# Patient Record
Sex: FEMALE | Race: WHITE | NOT HISPANIC OR LATINO | URBAN - METROPOLITAN AREA
[De-identification: names, ages, dates, MRNs, and addresses within clinical notes are randomized per-mention and may not be internally consistent; named-entity substitution may affect disease eponyms.]

---

## 2022-04-18 ENCOUNTER — WEB ENCOUNTER (OUTPATIENT)
Dept: URBAN - METROPOLITAN AREA CLINIC 70 | Facility: CLINIC | Age: 77
End: 2022-04-18

## 2022-04-18 ENCOUNTER — LAB OUTSIDE AN ENCOUNTER (OUTPATIENT)
Dept: URBAN - METROPOLITAN AREA CLINIC 70 | Facility: CLINIC | Age: 77
End: 2022-04-18

## 2022-04-18 ENCOUNTER — OFFICE VISIT (OUTPATIENT)
Dept: URBAN - METROPOLITAN AREA CLINIC 70 | Facility: CLINIC | Age: 77
End: 2022-04-18
Payer: MEDICARE

## 2022-04-18 VITALS
DIASTOLIC BLOOD PRESSURE: 73 MMHG | TEMPERATURE: 97.7 F | HEART RATE: 79 BPM | WEIGHT: 179 LBS | SYSTOLIC BLOOD PRESSURE: 124 MMHG | BODY MASS INDEX: 30.56 KG/M2 | HEIGHT: 64 IN

## 2022-04-18 DIAGNOSIS — K31.7 BENIGN GASTRIC POLYP: ICD-10-CM

## 2022-04-18 DIAGNOSIS — K59.09 CHRONIC CONSTIPATION: ICD-10-CM

## 2022-04-18 DIAGNOSIS — D50.0 IRON DEFICIENCY ANEMIA DUE TO CHRONIC BLOOD LOSS: ICD-10-CM

## 2022-04-18 DIAGNOSIS — Z86.010 PERSONAL HISTORY OF COLONIC POLYPS: ICD-10-CM

## 2022-04-18 DIAGNOSIS — K44.9 HIATAL HERNIA: ICD-10-CM

## 2022-04-18 DIAGNOSIS — R19.5 HEME + STOOL: ICD-10-CM

## 2022-04-18 DIAGNOSIS — K92.1 MELENA: ICD-10-CM

## 2022-04-18 PROBLEM — 78809005: Status: ACTIVE | Noted: 2022-04-18

## 2022-04-18 PROCEDURE — 99204 OFFICE O/P NEW MOD 45 MIN: CPT | Performed by: NURSE PRACTITIONER

## 2022-04-18 RX ORDER — CALCITRIOL 0.5 UG/1
CAPSULE, LIQUID FILLED ORAL
Qty: 0 | Refills: 0 | Status: DISCONTINUED | COMMUNITY
Start: 1900-01-01

## 2022-04-18 RX ORDER — POLYETHYLENE GLYCOL 3350 17 G/17G
MIX ONE SCOOP IN AT LEAST 8 OZ OF WATER, TEA, COFFEE, JUICE POWDER, FOR SOLUTION ORAL
Qty: 765 GRAMS | Refills: 11 | OUTPATIENT

## 2022-04-18 RX ORDER — APIXABAN 5 MG/1
TAKE 1 TABLET (5 MG) BY ORAL ROUTE 2 TIMES PER DAY TABLET, FILM COATED ORAL 2
Qty: 0 | Refills: 0 | Status: ACTIVE | COMMUNITY
Start: 1900-01-01

## 2022-04-18 RX ORDER — ALENDRONATE SODIUM 70 MG/1
TABLET ORAL
Qty: 0 | Refills: 0 | Status: ACTIVE | COMMUNITY
Start: 1900-01-01

## 2022-04-18 RX ORDER — VITAMIN A 2400 MCG
1 TABLET CAPSULE ORAL ONCE A DAY
Status: ACTIVE | COMMUNITY

## 2022-04-18 NOTE — HPI-TODAY'S VISIT:
Patient presents today due to positive occult blood noted in stool x 2 and anemia.   Stool was positive for occult blood x 2 in March 2022.  Labs 02/25/2022:  Hgb 7.08, MCV 88.5, , BUN 41 Labs 01/25/2022:  Hgb 8.28, .3, , BUN 23 Has been followed by hematology for over a year.  Has received several parental iron infusions over the last year.  Last PRBCs transfusion was about a month ago.  Started on oral iron tablets daily about 2 weeks ago.   Patient reports melenotic stools for about a year.  Defecation occurs 1-2 times a week. Stools are firm with straining.  Has had to perform manual disimpaction.    Last EGD August 2019:  Normal esophagus, variable Z-line,  6 cm sized hiatal hernia, 15 mm sessile multiloblated polyp in distal gastric body, minimal gastritis, normal duodenum. Last colonoscopy June 2016:  Hyperplastic polyp  EUS 09/2019:  Large hiatal hernia, 12 mm pedunculated gastric polyp s/p removal with hot snare, hypoechoic round mass in greater curvature in stomach in body measuring 13 mm x 8 mm (benign), gallstones with normal CBD.  Of note, patient has left upper arm AV graft that was placed one year ago.  However, denies having to start HD due to improving renal function.

## 2022-05-06 PROBLEM — 428283002: Status: ACTIVE | Noted: 2022-04-18

## 2022-05-06 PROBLEM — 724556004: Status: ACTIVE | Noted: 2022-04-18

## 2022-05-13 ENCOUNTER — TELEPHONE ENCOUNTER (OUTPATIENT)
Dept: URBAN - METROPOLITAN AREA CLINIC 92 | Facility: CLINIC | Age: 77
End: 2022-05-13

## 2022-05-13 ENCOUNTER — OFFICE VISIT (OUTPATIENT)
Dept: URBAN - METROPOLITAN AREA SURGERY CENTER 24 | Facility: SURGERY CENTER | Age: 77
End: 2022-05-13
Payer: MEDICARE

## 2022-05-13 ENCOUNTER — CLAIMS CREATED FROM THE CLAIM WINDOW (OUTPATIENT)
Dept: URBAN - METROPOLITAN AREA CLINIC 4 | Facility: CLINIC | Age: 77
End: 2022-05-13
Payer: MEDICARE

## 2022-05-13 DIAGNOSIS — K21.9 ACID REFLUX: ICD-10-CM

## 2022-05-13 DIAGNOSIS — D50.9 ANEMIA: ICD-10-CM

## 2022-05-13 DIAGNOSIS — R19.5 ABNORMAL CONSISTENCY OF STOOL: ICD-10-CM

## 2022-05-13 DIAGNOSIS — D12.0 BENIGN NEOPLASM OF CECUM: ICD-10-CM

## 2022-05-13 DIAGNOSIS — K31.89 ACQUIRED DEFORMITY OF DUODENUM: ICD-10-CM

## 2022-05-13 DIAGNOSIS — D12.0 ADENOMA OF CECUM: ICD-10-CM

## 2022-05-13 DIAGNOSIS — K21.9 GASTRO-ESOPHAGEAL REFLUX DISEASE WITHOUT ESOPHAGITIS: ICD-10-CM

## 2022-05-13 DIAGNOSIS — K29.70 GASTRITIS, UNSPECIFIED, WITHOUT BLEEDING: ICD-10-CM

## 2022-05-13 PROCEDURE — G8907 PT DOC NO EVENTS ON DISCHARG: HCPCS | Performed by: INTERNAL MEDICINE

## 2022-05-13 PROCEDURE — 88305 TISSUE EXAM BY PATHOLOGIST: CPT | Performed by: PATHOLOGY

## 2022-05-13 PROCEDURE — 43239 EGD BIOPSY SINGLE/MULTIPLE: CPT | Performed by: INTERNAL MEDICINE

## 2022-05-13 PROCEDURE — 45380 COLONOSCOPY AND BIOPSY: CPT | Performed by: INTERNAL MEDICINE

## 2022-05-13 PROCEDURE — 88312 SPECIAL STAINS GROUP 1: CPT | Performed by: PATHOLOGY

## 2022-05-13 RX ORDER — APIXABAN 5 MG/1
TAKE 1 TABLET (5 MG) BY ORAL ROUTE 2 TIMES PER DAY TABLET, FILM COATED ORAL 2
Qty: 0 | Refills: 0 | Status: ACTIVE | COMMUNITY
Start: 1900-01-01

## 2022-05-13 RX ORDER — ALENDRONATE SODIUM 70 MG/1
TABLET ORAL
Qty: 0 | Refills: 0 | Status: ACTIVE | COMMUNITY
Start: 1900-01-01

## 2022-05-13 RX ORDER — VITAMIN A 2400 MCG
1 TABLET CAPSULE ORAL ONCE A DAY
Status: ACTIVE | COMMUNITY

## 2022-05-13 RX ORDER — PANTOPRAZOLE SODIUM 40 MG/1
1 TABLET TABLET, DELAYED RELEASE ORAL ONCE A DAY
Qty: 90 TABLET | Refills: 0 | OUTPATIENT
Start: 2022-05-13

## 2022-05-13 RX ORDER — POLYETHYLENE GLYCOL 3350 17 G/17G
MIX ONE SCOOP IN AT LEAST 8 OZ OF WATER, TEA, COFFEE, JUICE POWDER, FOR SOLUTION ORAL
Qty: 765 GRAMS | Refills: 11 | Status: ACTIVE | COMMUNITY

## 2023-09-19 ENCOUNTER — P2P PATIENT RECORD (OUTPATIENT)
Age: 78
End: 2023-09-19

## 2023-09-27 ENCOUNTER — WEB ENCOUNTER (OUTPATIENT)
Dept: URBAN - METROPOLITAN AREA CLINIC 70 | Facility: CLINIC | Age: 78
End: 2023-09-27

## 2023-09-29 ENCOUNTER — LAB OUTSIDE AN ENCOUNTER (OUTPATIENT)
Dept: URBAN - METROPOLITAN AREA CLINIC 70 | Facility: CLINIC | Age: 78
End: 2023-09-29

## 2023-09-29 ENCOUNTER — OFFICE VISIT (OUTPATIENT)
Dept: URBAN - METROPOLITAN AREA CLINIC 70 | Facility: CLINIC | Age: 78
End: 2023-09-29
Payer: MEDICARE

## 2023-09-29 ENCOUNTER — WEB ENCOUNTER (OUTPATIENT)
Dept: URBAN - METROPOLITAN AREA CLINIC 70 | Facility: CLINIC | Age: 78
End: 2023-09-29

## 2023-09-29 VITALS
HEART RATE: 85 BPM | HEIGHT: 64 IN | TEMPERATURE: 96.8 F | SYSTOLIC BLOOD PRESSURE: 134 MMHG | WEIGHT: 191.4 LBS | BODY MASS INDEX: 32.68 KG/M2 | DIASTOLIC BLOOD PRESSURE: 79 MMHG

## 2023-09-29 DIAGNOSIS — D50.9 ANEMIA, IRON DEFICIENCY: ICD-10-CM

## 2023-09-29 PROCEDURE — 99214 OFFICE O/P EST MOD 30 MIN: CPT | Performed by: REGISTERED NURSE

## 2023-09-29 RX ORDER — VITAMIN A 2400 MCG
1 TABLET CAPSULE ORAL ONCE A DAY
Status: DISCONTINUED | COMMUNITY

## 2023-09-29 RX ORDER — ALENDRONATE SODIUM 70 MG/1
TABLET ORAL
Qty: 0 | Refills: 0 | Status: DISCONTINUED | COMMUNITY
Start: 1900-01-01

## 2023-09-29 RX ORDER — PANTOPRAZOLE SODIUM 40 MG/1
1 TABLET TABLET, DELAYED RELEASE ORAL ONCE A DAY
Qty: 90 TABLET | Refills: 0 | Status: ACTIVE | COMMUNITY
Start: 2022-05-13

## 2023-09-29 RX ORDER — POLYETHYLENE GLYCOL 3350 17 G/17G
MIX ONE SCOOP IN AT LEAST 8 OZ OF WATER, TEA, COFFEE, JUICE POWDER, FOR SOLUTION ORAL
Qty: 765 GRAMS | Refills: 11 | Status: DISCONTINUED | COMMUNITY

## 2023-09-29 RX ORDER — APIXABAN 5 MG/1
TAKE 1 TABLET (5 MG) BY ORAL ROUTE 2 TIMES PER DAY TABLET, FILM COATED ORAL 2
Qty: 0 | Refills: 0 | Status: ACTIVE | COMMUNITY
Start: 1900-01-01

## 2023-09-29 NOTE — HPI-TODAY'S VISIT:
Pt referred for anemia. They deny any GI bleeding. Labs on 9/12/23 show Hgb 9.7, MCV 91, Iron 19.0, Iron Sat 9.3%, and Ferritin 232.2. She has been getting IV iron with minimal improvement. Prior GI workup includes an EGD 5/13/22 with finding of a large hiatal hernia and gastritis(no hpylori) and a colonoscopy 5/13/22 with finding of diverticulosis and a tubular adenoma in the cecum(5 yr recall).

## 2023-10-24 ENCOUNTER — OFFICE VISIT (OUTPATIENT)
Dept: URBAN - METROPOLITAN AREA CLINIC 69 | Facility: CLINIC | Age: 78
End: 2023-10-24
Payer: MEDICARE

## 2023-10-24 DIAGNOSIS — D50.0 1. ANEMIA, IRON DEFICIENCY FROM CHRONIC BLOOD LOSS:: ICD-10-CM

## 2023-10-24 PROCEDURE — 91110 GI TRC IMG INTRAL ESOPH-ILE: CPT | Performed by: INTERNAL MEDICINE

## 2023-10-24 RX ORDER — PANTOPRAZOLE SODIUM 40 MG/1
1 TABLET TABLET, DELAYED RELEASE ORAL ONCE A DAY
Qty: 90 TABLET | Refills: 0 | Status: ACTIVE | COMMUNITY
Start: 2022-05-13

## 2023-10-24 RX ORDER — APIXABAN 5 MG/1
TAKE 1 TABLET (5 MG) BY ORAL ROUTE 2 TIMES PER DAY TABLET, FILM COATED ORAL 2
Qty: 0 | Refills: 0 | Status: ACTIVE | COMMUNITY
Start: 1900-01-01

## 2023-11-09 ENCOUNTER — TELEPHONE ENCOUNTER (OUTPATIENT)
Dept: URBAN - METROPOLITAN AREA CLINIC 70 | Facility: CLINIC | Age: 78
End: 2023-11-09

## 2023-11-09 ENCOUNTER — LAB OUTSIDE AN ENCOUNTER (OUTPATIENT)
Dept: URBAN - METROPOLITAN AREA CLINIC 70 | Facility: CLINIC | Age: 78
End: 2023-11-09

## 2023-11-15 ENCOUNTER — OFFICE VISIT (OUTPATIENT)
Dept: URBAN - METROPOLITAN AREA SURGERY CENTER 24 | Facility: SURGERY CENTER | Age: 78
End: 2023-11-15

## 2023-11-27 ENCOUNTER — TELEPHONE ENCOUNTER (OUTPATIENT)
Dept: URBAN - METROPOLITAN AREA CLINIC 70 | Facility: CLINIC | Age: 78
End: 2023-11-27

## 2023-12-06 ENCOUNTER — CLAIMS CREATED FROM THE CLAIM WINDOW (OUTPATIENT)
Dept: URBAN - METROPOLITAN AREA SURGERY CENTER 24 | Facility: SURGERY CENTER | Age: 78
End: 2023-12-06
Payer: MEDICARE

## 2023-12-06 DIAGNOSIS — K55.21 ACQUIRED ARTERIOVENOUS MALFORMATION OF SMALL INTESTINE WITH HEMORRHAGE: ICD-10-CM

## 2023-12-06 DIAGNOSIS — K55.8 OTHER VASCULAR DISORDERS OF INTESTINE: ICD-10-CM

## 2023-12-06 DIAGNOSIS — K92.1 MELENA: ICD-10-CM

## 2023-12-06 DIAGNOSIS — K44.9 HIATAL HERNIA: ICD-10-CM

## 2023-12-06 PROCEDURE — 44366 SMALL BOWEL ENDOSCOPY: CPT | Performed by: INTERNAL MEDICINE

## 2023-12-06 PROCEDURE — 00731 ANES UPR GI NDSC PX NOS: CPT | Performed by: NURSE ANESTHETIST, CERTIFIED REGISTERED

## 2023-12-06 PROCEDURE — G8907 PT DOC NO EVENTS ON DISCHARG: HCPCS | Performed by: INTERNAL MEDICINE

## 2023-12-06 RX ORDER — APIXABAN 5 MG/1
TAKE 1 TABLET (5 MG) BY ORAL ROUTE 2 TIMES PER DAY TABLET, FILM COATED ORAL 2
Qty: 0 | Refills: 0 | Status: ACTIVE | COMMUNITY
Start: 1900-01-01

## 2023-12-06 RX ORDER — PANTOPRAZOLE SODIUM 40 MG/1
1 TABLET TABLET, DELAYED RELEASE ORAL ONCE A DAY
Qty: 90 TABLET | Refills: 0 | Status: ACTIVE | COMMUNITY
Start: 2022-05-13

## 2023-12-21 ENCOUNTER — DASHBOARD ENCOUNTERS (OUTPATIENT)
Age: 78
End: 2023-12-21

## 2023-12-21 ENCOUNTER — OFFICE VISIT (OUTPATIENT)
Dept: URBAN - METROPOLITAN AREA CLINIC 70 | Facility: CLINIC | Age: 78
End: 2023-12-21
Payer: MEDICARE

## 2023-12-21 VITALS
SYSTOLIC BLOOD PRESSURE: 146 MMHG | HEART RATE: 72 BPM | TEMPERATURE: 97.5 F | WEIGHT: 185.6 LBS | DIASTOLIC BLOOD PRESSURE: 77 MMHG | HEIGHT: 64 IN | BODY MASS INDEX: 31.69 KG/M2

## 2023-12-21 DIAGNOSIS — K55.21 AVM (ARTERIOVENOUS MALFORMATION) OF SMALL BOWEL, ACQUIRED WITH HEMORRHAGE: ICD-10-CM

## 2023-12-21 DIAGNOSIS — D50.9 ANEMIA, IRON DEFICIENCY: ICD-10-CM

## 2023-12-21 PROCEDURE — 99214 OFFICE O/P EST MOD 30 MIN: CPT | Performed by: REGISTERED NURSE

## 2023-12-21 RX ORDER — APIXABAN 5 MG/1
TAKE 1 TABLET (5 MG) BY ORAL ROUTE 2 TIMES PER DAY TABLET, FILM COATED ORAL 2
Qty: 0 | Refills: 0 | Status: ACTIVE | COMMUNITY
Start: 1900-01-01

## 2023-12-21 RX ORDER — PANTOPRAZOLE SODIUM 40 MG/1
1 TABLET TABLET, DELAYED RELEASE ORAL ONCE A DAY
Qty: 90 TABLET | Refills: 0 | Status: ACTIVE | COMMUNITY
Start: 2022-05-13

## 2023-12-21 NOTE — HPI-OTHER HISTORIES
Note from OV 9/29/23: Pt referred for anemia. They deny any GI bleeding. Labs on 9/12/23 show Hgb 9.7, MCV 91, Iron 19.0, Iron Sat 9.3%, and Ferritin 232.2. She has been getting IV iron with minimal improvement. Prior GI workup includes an EGD 5/13/22 with finding of a large hiatal hernia and gastritis(no hpylori) and a colonoscopy 5/13/22 with finding of diverticulosis and a tubular adenoma in the cecum(5 yr recall). --------------------------------------------------

## 2023-12-21 NOTE — HPI-TODAY'S VISIT:
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 A repeat EGD with push enteroscopy was done on 12/6/23 and the actively bleeding AVM was treated with an endoclip. She is being followed by hem/onc. Labs from 12/19/23 showed hgb 9.7. Pt reports that was 8 on the previous labwork.

## 2024-01-30 ENCOUNTER — TELEPHONE ENCOUNTER (OUTPATIENT)
Dept: URBAN - METROPOLITAN AREA CLINIC 70 | Facility: CLINIC | Age: 79
End: 2024-01-30

## 2025-06-15 ENCOUNTER — P2P PATIENT RECORD (OUTPATIENT)
Age: 80
End: 2025-06-15

## 2025-06-17 ENCOUNTER — P2P PATIENT RECORD (OUTPATIENT)
Age: 80
End: 2025-06-17